# Patient Record
Sex: MALE | Race: WHITE | NOT HISPANIC OR LATINO | Employment: OTHER | URBAN - METROPOLITAN AREA
[De-identification: names, ages, dates, MRNs, and addresses within clinical notes are randomized per-mention and may not be internally consistent; named-entity substitution may affect disease eponyms.]

---

## 2021-05-16 ENCOUNTER — HOSPITAL ENCOUNTER (EMERGENCY)
Facility: HOSPITAL | Age: 71
Discharge: HOME | End: 2021-05-16
Attending: EMERGENCY MEDICINE | Admitting: EMERGENCY MEDICINE

## 2021-05-16 ENCOUNTER — APPOINTMENT (OUTPATIENT)
Dept: CT IMAGING | Facility: HOSPITAL | Age: 71
End: 2021-05-16

## 2021-05-16 VITALS
OXYGEN SATURATION: 97 % | HEART RATE: 100 BPM | SYSTOLIC BLOOD PRESSURE: 143 MMHG | HEIGHT: 67 IN | BODY MASS INDEX: 30.76 KG/M2 | DIASTOLIC BLOOD PRESSURE: 82 MMHG | WEIGHT: 196 LBS | TEMPERATURE: 97 F | RESPIRATION RATE: 22 BRPM
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
## 2021-05-16 DIAGNOSIS — R04.0 EPISTAXIS: ICD-10-CM

## 2021-05-16 DIAGNOSIS — S09.92XA INJURY OF NOSE, INITIAL ENCOUNTER: Primary | ICD-10-CM

## 2021-05-16 PROCEDURE — 99283 EMERGENCY DEPT VISIT LOW MDM: CPT

## 2021-05-16 PROCEDURE — 70486 CT MAXILLOFACIAL W/O DYE: CPT

## 2021-05-16 RX ORDER — BACITRACIN, NEOMYCIN, POLYMYXIN B 400; 3.5; 5 [USP'U]/G; MG/G; [USP'U]/G
1 OINTMENT TOPICAL ONCE
Status: COMPLETED | OUTPATIENT
Start: 2021-05-16 | End: 2021-05-16

## 2021-05-16 RX ORDER — LISINOPRIL AND HYDROCHLOROTHIAZIDE 25; 20 MG/1; MG/1
1 TABLET ORAL DAILY
COMMUNITY

## 2021-05-16 RX ADMIN — BACITRACIN, NEOMYCIN, POLYMYXIN B 1 APPLICATION: 400; 3.5; 5 OINTMENT TOPICAL at 11:10

## 2021-05-16 NOTE — DISCHARGE INSTRUCTIONS
Several studies have shown that the combination of ibuprofen and acetaminophen is more effective at pain relief than narcotics.    Take 3 over-the-counter Ibuprofen tablets every 6 hours as needed for pain. Try to take the medication with food. If you develop upper abdominal discomfort, discontinue use.    If not taking another medication which contains Tylenol/acetaminophen, you may also take Tylenol every 6 hours, with a maximum 1,000 mg (two extra strength tablets) per dose.    Apply antibiotic ointment to the nasal bridge over the next 3 days then discontinue.

## 2021-05-16 NOTE — ED PROVIDER NOTES
EMERGENCY DEPARTMENT ENCOUNTER    Pt Name: Karlo Flores  MRN: [id number]  Pt :   1950  Room Number:  RW1/R1  Date of encounter:  2021  PCP: Elva Dela Cruz MD  ED Provider: Sang Michel MD    Historian: Patient      HPI:  Chief Complaint: Facial injury        Context: Karlo Flores is a 70 y.o. male who presents to the ED c/o facial injury sustained in a fall from ladder.  He was roughly half to two thirds of the way up on a 5 foot ladder when the ladder slid from below him.  He fell forward and struck his face bracing his fall with his hands.  He denies neck and back pain he denies head injury other than the facial/nose injury.  He reports pain at a moderate level.  He felt some crinkling when touching his nose.  He had bleeding from both nares.  He denies nausea, vomiting, dizziness.  He has had no chest or abdominal complaints.  The fall occurred shortly prior to presentation in the emergency department.      PAST MEDICAL HISTORY  Past Medical History:   Diagnosis Date   • Hyperlipidemia    • Hypertension          PAST SURGICAL HISTORY  Past Surgical History:   Procedure Laterality Date   • APPENDECTOMY     • HERNIA REPAIR     • SKIN BIOPSY     • THYROID SURGERY           FAMILY HISTORY  History reviewed. No pertinent family history.      SOCIAL HISTORY  Social History     Socioeconomic History   • Marital status:      Spouse name: Not on file   • Number of children: Not on file   • Years of education: Not on file   • Highest education level: Not on file   Tobacco Use   • Smoking status: Never Smoker   • Smokeless tobacco: Never Used   Substance and Sexual Activity   • Alcohol use: Yes     Comment: 2/week   • Drug use: Never         ALLERGIES  Bicillin [penicillin g benzathine]        REVIEW OF SYSTEMS  Review of Systems       All systems reviewed and negative except for those discussed in HPI.       PHYSICAL EXAM    I have reviewed the triage vital signs and nursing notes.    ED  Triage Vitals [05/16/21 0819]   Temp Heart Rate Resp BP SpO2   97 °F (36.1 °C) 100 22 143/82 97 %      Temp src Heart Rate Source Patient Position BP Location FiO2 (%)   Infrared Monitor Sitting Left arm --       Physical Exam  GENERAL:   Appears pleasant and nontoxic  HENT: Nares patent.  Swelling along the lateral aspect of the left side nasal bones.  Minimal epistaxis is noted in both nares, neither are active.  No septal hematomas are noted.  No nasal bone instability.  EYES: No scleral icterus.  CV: Regular rhythm, regular rate.  2+ radial pulse  RESPIRATORY: Normal effort.  No audible wheezes, rales or rhonchi.  ABDOMEN: Soft, nontender  MUSCULOSKELETAL: No deformities.  No CT LS spine tenderness  NEURO: Alert, moves all extremities, follows commands.  SKIN: Warm, dry, no rash visualized.        LAB RESULTS  No results found for this or any previous visit (from the past 24 hour(s)).    If labs were ordered, I independently reviewed the results.        RADIOLOGY  CT Facial Bones Without Contrast    Result Date: 5/16/2021  EXAMINATION: CT FACIAL BONES WO CONTRAST-  INDICATION: fall nosebleed, laceration, fell and hit face  TECHNIQUE: Multiple axial CT imaging is obtained of the facial bones without the ministration of intravenous contrast. Coronal and sagittal reformatted images persuaded to further facilitate diagnostic accuracy and treatment planning.  The radiation dose reduction device was turned on for each scan per the ALARA (As Low as Reasonably Achievable) protocol.  COMPARISON: NONE  FINDINGS: There is nasal septal deviation identified to the right. No evidence of a nasal bone fracture. There is laceration identified in the soft tissues overlying the nasal bones. There is no significant mucosal thickening. Ethmoid air cells are patent. Frontal sinuses are patent. Globes and of its are intact. The mastoid air cells are patent. There is no facial bone abnormality identified. No facial fracture. The  mandible is intact. There are degenerative changes identified within the cervical spine.        Septal deviation identified to the right with laceration identified of the soft tissues overlying the nose with no evidence of nasal bone fracture.         I ordered and reviewed the above noted radiographic studies.      I viewed images of CT facial bones which showed deviated septum but no definite facial fractures per my independent interpretation.    See radiologist's dictation for official interpretation.        PROCEDURES    Procedures    No orders to display       MEDICATIONS GIVEN IN ER    Medications - No data to display      PROGRESS, DATA ANALYSIS, CONSULTS, AND MEDICAL DECISION MAKING    All labs have been independently reviewed by me.  All radiology studies have been reviewed by me and the radiologist dictating the report.   EKG's have been independently viewed and interpreted by me.          ED Course as of May 16 1053   Sun May 16, 2021   1052 The patient is confident that his nose is broken.  He does have a deviated septum.  He said long-term nasal problems.  I will refer him to ENT.  I had nursing staff administer antibiotic ointment.  I spoke with patient concerning discharge instructions.    [MS]      ED Course User Index  [MS] Sang Michel MD             AS OF 10:53 EDT VITALS:    BP - 143/82  HR - 100  TEMP - 97 °F (36.1 °C) (Infrared)  O2 SATS - 97%        DIAGNOSIS  Final diagnoses:   Injury of nose, initial encounter   Epistaxis   Fall, initial encounter         DISPOSITION  DISCHARGE    FOLLOW-UP  Martín Hoffman MD  [address]  [facility] [address] [phone]      NEXT AVAILABLE APPOINTMENT - RECHECK OF CONDITION    [facility] Emergency Department  [address] [facility] [address] [phone]    IF YOU HAVE ANY CONCERN OF WORSENING CONDITION         Medication List      No changes were made to your prescriptions during this  visit.                  Sang Michel MD  05/16/21 5225

## 2021-06-04 ENCOUNTER — APPOINTMENT (OUTPATIENT)
Dept: PREADMISSION TESTING | Facility: HOSPITAL | Age: 71
End: 2021-06-04

## 2021-06-04 PROCEDURE — U0004 COV-19 TEST NON-CDC HGH THRU: HCPCS

## 2021-06-04 PROCEDURE — C9803 HOPD COVID-19 SPEC COLLECT: HCPCS

## 2021-06-05 LAB — SARS-COV-2 RNA NOSE QL NAA+PROBE: NOT DETECTED

## 2022-01-14 ENCOUNTER — TRANSCRIBE ORDERS (OUTPATIENT)
Dept: OTHER | Facility: OTHER | Age: 72
End: 2022-01-14

## 2022-01-14 DIAGNOSIS — E04.9 ENLARGEMENT OF THYROID: Primary | ICD-10-CM

## 2022-05-24 ENCOUNTER — TRANSCRIBE ORDERS (OUTPATIENT)
Dept: ADMINISTRATIVE | Facility: HOSPITAL | Age: 72
End: 2022-05-24

## 2022-05-24 DIAGNOSIS — Z11.59 ENCOUNTER FOR SCREENING FOR VIRAL DISEASE: Primary | ICD-10-CM

## 2022-06-27 ENCOUNTER — LAB (OUTPATIENT)
Dept: PREADMISSION TESTING | Facility: HOSPITAL | Age: 72
End: 2022-06-27

## 2022-06-27 DIAGNOSIS — Z11.59 ENCOUNTER FOR SCREENING FOR VIRAL DISEASE: ICD-10-CM

## 2022-06-27 LAB — SARS-COV-2 RNA PNL SPEC NAA+PROBE: NOT DETECTED

## 2022-06-27 PROCEDURE — C9803 HOPD COVID-19 SPEC COLLECT: HCPCS

## 2022-06-27 PROCEDURE — U0004 COV-19 TEST NON-CDC HGH THRU: HCPCS | Performed by: OTOLARYNGOLOGY

## 2022-06-30 ENCOUNTER — LAB REQUISITION (OUTPATIENT)
Dept: LAB | Facility: HOSPITAL | Age: 72
End: 2022-06-30

## 2022-06-30 DIAGNOSIS — J34.2 DEVIATED NASAL SEPTUM: ICD-10-CM

## 2022-06-30 LAB — POTASSIUM SERPL-SCNC: 3.6 MMOL/L (ref 3.5–5.2)

## 2022-06-30 PROCEDURE — 84132 ASSAY OF SERUM POTASSIUM: CPT | Performed by: OTOLARYNGOLOGY
